# Patient Record
Sex: FEMALE | Race: WHITE
[De-identification: names, ages, dates, MRNs, and addresses within clinical notes are randomized per-mention and may not be internally consistent; named-entity substitution may affect disease eponyms.]

---

## 2021-10-16 ENCOUNTER — HOSPITAL ENCOUNTER (OUTPATIENT)
Dept: HOSPITAL 95 - LAB SHORT | Age: 71
Discharge: HOME | End: 2021-10-16
Attending: NURSE PRACTITIONER
Payer: MEDICARE

## 2021-10-16 DIAGNOSIS — R19.7: ICD-10-CM

## 2021-10-16 DIAGNOSIS — R10.30: Primary | ICD-10-CM

## 2021-10-16 LAB
ALBUMIN SERPL BCP-MCNC: 4 G/DL (ref 3.4–5)
ALBUMIN/GLOB SERPL: 1.2 {RATIO} (ref 0.8–1.8)
ALT SERPL W P-5'-P-CCNC: 32 U/L (ref 12–78)
ANION GAP SERPL CALCULATED.4IONS-SCNC: 6 MMOL/L (ref 6–16)
AST SERPL W P-5'-P-CCNC: 31 U/L (ref 12–37)
BASOPHILS # BLD AUTO: 0.05 K/MM3 (ref 0–0.23)
BASOPHILS NFR BLD AUTO: 1 % (ref 0–2)
BILIRUB SERPL-MCNC: 0.4 MG/DL (ref 0.1–1)
BUN SERPL-MCNC: 20 MG/DL (ref 8–24)
CALCIUM SERPL-MCNC: 9.3 MG/DL (ref 8.5–10.1)
CHLORIDE SERPL-SCNC: 109 MMOL/L (ref 98–108)
CO2 SERPL-SCNC: 26 MMOL/L (ref 21–32)
CREAT SERPL-MCNC: 0.88 MG/DL (ref 0.4–1)
DEPRECATED RDW RBC AUTO: 43.8 FL (ref 35.1–46.3)
EOSINOPHIL # BLD AUTO: 0.09 K/MM3 (ref 0–0.68)
EOSINOPHIL NFR BLD AUTO: 1 % (ref 0–6)
ERYTHROCYTE [DISTWIDTH] IN BLOOD BY AUTOMATED COUNT: 14 % (ref 11.7–14.2)
GLOBULIN SER CALC-MCNC: 3.4 G/DL (ref 2.2–4)
GLUCOSE SERPL-MCNC: 108 MG/DL (ref 70–99)
HCT VFR BLD AUTO: 40.6 % (ref 33–51)
HGB BLD-MCNC: 13.2 G/DL (ref 11.5–16)
IMM GRANULOCYTES # BLD AUTO: 0.04 K/MM3 (ref 0–0.1)
IMM GRANULOCYTES NFR BLD AUTO: 0 % (ref 0–1)
LYMPHOCYTES # BLD AUTO: 2.12 K/MM3 (ref 0.84–5.2)
LYMPHOCYTES NFR BLD AUTO: 21 % (ref 21–46)
MCHC RBC AUTO-ENTMCNC: 32.5 G/DL (ref 31.5–36.5)
MCV RBC AUTO: 86 FL (ref 80–100)
MONOCYTES # BLD AUTO: 0.65 K/MM3 (ref 0.16–1.47)
MONOCYTES NFR BLD AUTO: 7 % (ref 4–13)
NEUTROPHILS # BLD AUTO: 6.97 K/MM3 (ref 1.96–9.15)
NEUTROPHILS NFR BLD AUTO: 70 % (ref 41–73)
NRBC # BLD AUTO: 0 K/MM3 (ref 0–0.02)
NRBC BLD AUTO-RTO: 0 /100 WBC (ref 0–0.2)
PLATELET # BLD AUTO: 214 K/MM3 (ref 150–400)
POTASSIUM SERPL-SCNC: 4.7 MMOL/L (ref 3.5–5.5)
PROT SERPL-MCNC: 7.4 G/DL (ref 6.4–8.2)
SODIUM SERPL-SCNC: 141 MMOL/L (ref 136–145)

## 2021-10-18 ENCOUNTER — HOSPITAL ENCOUNTER (OUTPATIENT)
Dept: HOSPITAL 95 - LAB | Age: 71
Discharge: HOME | End: 2021-10-18
Attending: NURSE PRACTITIONER
Payer: MEDICARE

## 2021-10-18 DIAGNOSIS — R10.30: Primary | ICD-10-CM

## 2021-10-18 DIAGNOSIS — R19.7: ICD-10-CM

## 2021-10-18 PROCEDURE — G0328 FECAL BLOOD SCRN IMMUNOASSAY: HCPCS

## 2021-11-14 ENCOUNTER — HOSPITAL ENCOUNTER (OUTPATIENT)
Dept: HOSPITAL 95 - LAB SHORT | Age: 71
Discharge: HOME | End: 2021-11-14
Attending: NURSE PRACTITIONER
Payer: MEDICARE

## 2021-11-14 DIAGNOSIS — R39.15: Primary | ICD-10-CM

## 2021-11-22 ENCOUNTER — HOSPITAL ENCOUNTER (OUTPATIENT)
Dept: HOSPITAL 95 - LAB SHORT | Age: 71
End: 2021-11-22
Attending: FAMILY MEDICINE
Payer: MEDICARE

## 2021-11-22 DIAGNOSIS — Z88.5: ICD-10-CM

## 2021-11-22 DIAGNOSIS — Z88.2: ICD-10-CM

## 2021-11-22 DIAGNOSIS — J02.9: ICD-10-CM

## 2021-11-22 DIAGNOSIS — Z12.72: ICD-10-CM

## 2021-11-22 DIAGNOSIS — Z88.1: ICD-10-CM

## 2021-11-22 DIAGNOSIS — N76.1: ICD-10-CM

## 2021-11-22 DIAGNOSIS — N76.0: ICD-10-CM

## 2021-11-22 DIAGNOSIS — J45.30: Primary | ICD-10-CM

## 2021-11-22 PROCEDURE — G0145 SCR C/V CYTO,THINLAYER,RESCR: HCPCS

## 2021-11-24 LAB — OTHER STN SPEC: (no result)

## 2023-01-11 ENCOUNTER — HOSPITAL ENCOUNTER (OUTPATIENT)
Dept: HOSPITAL 95 - LAB SHORT | Age: 73
Discharge: HOME | End: 2023-01-11
Payer: MEDICARE

## 2023-01-11 DIAGNOSIS — D16.11: Primary | ICD-10-CM

## 2024-09-26 ENCOUNTER — HOSPITAL ENCOUNTER (OUTPATIENT)
Dept: HOSPITAL 95 - LAB | Age: 74
End: 2024-09-26
Attending: FAMILY MEDICINE
Payer: MEDICARE

## 2024-09-26 DIAGNOSIS — B37.0: Primary | ICD-10-CM

## 2025-02-19 ENCOUNTER — HOSPITAL ENCOUNTER (OUTPATIENT)
Dept: HOSPITAL 95 - ORSCSDS | Age: 75
Discharge: HOME | End: 2025-02-19
Attending: OPHTHALMOLOGY
Payer: MEDICARE

## 2025-02-19 VITALS — DIASTOLIC BLOOD PRESSURE: 56 MMHG | SYSTOLIC BLOOD PRESSURE: 106 MMHG

## 2025-02-19 VITALS — WEIGHT: 132.06 LBS | BODY MASS INDEX: 24.3 KG/M2 | HEIGHT: 62 IN

## 2025-02-19 DIAGNOSIS — E11.36: Primary | ICD-10-CM

## 2025-02-19 DIAGNOSIS — H25.813: ICD-10-CM

## 2025-02-19 DIAGNOSIS — F17.210: ICD-10-CM

## 2025-02-19 DIAGNOSIS — Z79.84: ICD-10-CM

## 2025-02-19 DIAGNOSIS — Z79.82: ICD-10-CM

## 2025-02-19 DIAGNOSIS — G35: ICD-10-CM

## 2025-02-19 DIAGNOSIS — I73.9: ICD-10-CM

## 2025-02-19 DIAGNOSIS — Z79.899: ICD-10-CM

## 2025-02-19 PROCEDURE — 08RJ3JZ REPLACEMENT OF RIGHT LENS WITH SYNTHETIC SUBSTITUTE, PERCUTANEOUS APPROACH: ICD-10-PCS | Performed by: OPHTHALMOLOGY

## 2025-02-19 PROCEDURE — A9270 NON-COVERED ITEM OR SERVICE: HCPCS

## 2025-02-19 PROCEDURE — V2632 POST CHMBR INTRAOCULAR LENS: HCPCS

## 2025-02-19 NOTE — NUR
02/19/25 1220 Gabriella Cooper
PRIOR TO ADMINISTRATION OF 10MG PO VALIUM AT 1217 PT REPORTS ANXIETY
AT 2/10

## 2025-02-19 NOTE — NUR
02/19/25 1351 Eulalia Adams
D/LOI INSTRUCTIONS GIVEN TO PT, UNDERSTANDING VERBALIZED. PT GIVEN EYE
KIT. PT DENIES PAIN/NAUSEA. PT TOLERATING CLEAR LIQUIDS W/O COMPLAINT.
VSS, ON RA. PT WHEELED TO PRIVATE VEHICLE, STEADY GAIT NOTED UPON
TRANSFER. NO VISIBLE SIGNS OF DISTRESS NOTED.

## 2025-02-26 ENCOUNTER — HOSPITAL ENCOUNTER (OUTPATIENT)
Dept: HOSPITAL 95 - ORSCSDS | Age: 75
Discharge: HOME | End: 2025-02-26
Attending: OPHTHALMOLOGY
Payer: MEDICARE

## 2025-02-26 VITALS — WEIGHT: 132.5 LBS | HEIGHT: 63 IN | BODY MASS INDEX: 23.48 KG/M2

## 2025-02-26 VITALS — SYSTOLIC BLOOD PRESSURE: 104 MMHG | DIASTOLIC BLOOD PRESSURE: 51 MMHG

## 2025-02-26 DIAGNOSIS — Z96.1: ICD-10-CM

## 2025-02-26 DIAGNOSIS — E78.5: ICD-10-CM

## 2025-02-26 DIAGNOSIS — Z79.899: ICD-10-CM

## 2025-02-26 DIAGNOSIS — H25.812: ICD-10-CM

## 2025-02-26 DIAGNOSIS — H53.2: ICD-10-CM

## 2025-02-26 DIAGNOSIS — R06.02: ICD-10-CM

## 2025-02-26 DIAGNOSIS — F17.210: ICD-10-CM

## 2025-02-26 DIAGNOSIS — Z79.84: ICD-10-CM

## 2025-02-26 DIAGNOSIS — E11.36: Primary | ICD-10-CM

## 2025-02-26 DIAGNOSIS — Z79.82: ICD-10-CM

## 2025-02-26 DIAGNOSIS — H40.009: ICD-10-CM

## 2025-02-26 DIAGNOSIS — I10: ICD-10-CM

## 2025-02-26 PROCEDURE — V2632 POST CHMBR INTRAOCULAR LENS: HCPCS

## 2025-02-26 PROCEDURE — 08RK3JZ REPLACEMENT OF LEFT LENS WITH SYNTHETIC SUBSTITUTE, PERCUTANEOUS APPROACH: ICD-10-PCS | Performed by: OPHTHALMOLOGY
